# Patient Record
Sex: MALE | Race: BLACK OR AFRICAN AMERICAN | Employment: FULL TIME | URBAN - METROPOLITAN AREA
[De-identification: names, ages, dates, MRNs, and addresses within clinical notes are randomized per-mention and may not be internally consistent; named-entity substitution may affect disease eponyms.]

---

## 2018-08-31 ENCOUNTER — OFFICE VISIT (OUTPATIENT)
Dept: FAMILY MEDICINE CLINIC | Facility: CLINIC | Age: 48
End: 2018-08-31
Payer: COMMERCIAL

## 2018-08-31 ENCOUNTER — TELEPHONE (OUTPATIENT)
Dept: FAMILY MEDICINE CLINIC | Facility: CLINIC | Age: 48
End: 2018-08-31

## 2018-08-31 VITALS
HEIGHT: 69 IN | TEMPERATURE: 97.3 F | HEART RATE: 86 BPM | WEIGHT: 128 LBS | SYSTOLIC BLOOD PRESSURE: 118 MMHG | RESPIRATION RATE: 18 BRPM | BODY MASS INDEX: 18.96 KG/M2 | DIASTOLIC BLOOD PRESSURE: 80 MMHG

## 2018-08-31 DIAGNOSIS — M25.562 ACUTE PAIN OF BOTH KNEES: ICD-10-CM

## 2018-08-31 DIAGNOSIS — M25.561 ACUTE PAIN OF BOTH KNEES: ICD-10-CM

## 2018-08-31 DIAGNOSIS — Z13.6 SCREENING FOR CARDIOVASCULAR CONDITION: ICD-10-CM
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
## 2018-08-31 DIAGNOSIS — Z12.5 SCREENING FOR PROSTATE CANCER: ICD-10-CM

## 2018-08-31 PROCEDURE — 99213 OFFICE O/P EST LOW 20 MIN: CPT | Performed by: FAMILY MEDICINE

## 2018-08-31 RX ORDER — ALBUTEROL SULFATE 90 UG/1
1-2 AEROSOL, METERED RESPIRATORY (INHALATION)
COMMUNITY
Start: 2016-05-12

## 2018-08-31 RX ORDER — NAPROXEN 500 MG/1
TABLET ORAL
COMMUNITY
Start: 2018-07-30

## 2018-08-31 RX ORDER — PREDNISONE 20 MG/1
TABLET ORAL
Qty: 32 TABLET | Refills: 0 | Status: SHIPPED | OUTPATIENT
Start: 2018-08-31

## 2018-08-31 RX ORDER — CYCLOBENZAPRINE HCL 10 MG
TABLET ORAL
COMMUNITY
Start: 2018-07-30

## 2018-08-31 NOTE — ASSESSMENT & PLAN NOTE
Seems to be more of contusion to an internal derangement as he is pretty tender  I will treat him with prednisone as well as voltaren and follow    If he does not get better will suggest he see ortho but I would give this course a good three weeks
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
## 2018-08-31 NOTE — TELEPHONE ENCOUNTER
PA done for pt under [employer] [id number] Diclofenac Sodium 1%   Case ID [id number]  Approved until 08/31/2019  Pt and Pharmacy are aware  [email or web address]

## 2018-09-18 DIAGNOSIS — M25.561 ACUTE PAIN OF BOTH KNEES: ICD-10-CM

## 2018-09-18 DIAGNOSIS — M25.562 ACUTE PAIN OF BOTH KNEES: ICD-10-CM
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
## 2018-09-18 RX ORDER — PREDNISONE 20 MG/1
TABLET ORAL
Qty: 32 TABLET | Refills: 0 | OUTPATIENT
Start: 2018-09-18

## 2018-09-24 DIAGNOSIS — M25.562 ACUTE PAIN OF BOTH KNEES: ICD-10-CM

## 2018-09-24 DIAGNOSIS — M25.561 ACUTE PAIN OF BOTH KNEES: ICD-10-CM
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
## 2018-09-27 ENCOUNTER — APPOINTMENT (OUTPATIENT)
Dept: RADIOLOGY | Facility: CLINIC | Age: 48
End: 2018-09-27
Payer: COMMERCIAL

## 2018-09-27 VITALS
HEART RATE: 79 BPM | BODY MASS INDEX: 18.18 KG/M2 | DIASTOLIC BLOOD PRESSURE: 82 MMHG | WEIGHT: 127 LBS | SYSTOLIC BLOOD PRESSURE: 118 MMHG | HEIGHT: 70 IN

## 2018-09-27 DIAGNOSIS — M22.41 CHONDROMALACIA OF BOTH PATELLAE: ICD-10-CM

## 2018-09-27 DIAGNOSIS — M25.562 PAIN IN BOTH KNEES, UNSPECIFIED CHRONICITY: ICD-10-CM

## 2018-09-27 DIAGNOSIS — M25.561 PAIN IN BOTH KNEES, UNSPECIFIED CHRONICITY: ICD-10-CM

## 2018-09-27 DIAGNOSIS — M22.42 CHONDROMALACIA OF BOTH PATELLAE: ICD-10-CM

## 2018-09-27 DIAGNOSIS — M25.562 ACUTE PAIN OF LEFT KNEE: Primary | ICD-10-CM
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
## 2018-09-27 PROCEDURE — 73562 X-RAY EXAM OF KNEE 3: CPT

## 2018-09-27 PROCEDURE — 99204 OFFICE O/P NEW MOD 45 MIN: CPT | Performed by: ORTHOPAEDIC SURGERY
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
## 2018-11-04 DIAGNOSIS — M25.562 ACUTE PAIN OF BOTH KNEES: ICD-10-CM

## 2018-11-04 DIAGNOSIS — M25.561 ACUTE PAIN OF BOTH KNEES: ICD-10-CM
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
## 2018-11-29 VITALS
HEART RATE: 80 BPM | DIASTOLIC BLOOD PRESSURE: 69 MMHG | WEIGHT: 131.2 LBS | HEIGHT: 70 IN | SYSTOLIC BLOOD PRESSURE: 113 MMHG | BODY MASS INDEX: 18.78 KG/M2

## 2018-11-29 DIAGNOSIS — M22.42 CHONDROMALACIA OF BOTH PATELLAE: Primary | ICD-10-CM

## 2018-11-29 DIAGNOSIS — S83.412D SPRAIN OF MEDIAL COLLATERAL LIGAMENT OF LEFT KNEE, SUBSEQUENT ENCOUNTER: ICD-10-CM

## 2018-11-29 DIAGNOSIS — M25.562 ACUTE PAIN OF BOTH KNEES: ICD-10-CM

## 2018-11-29 DIAGNOSIS — M22.41 CHONDROMALACIA OF BOTH PATELLAE: Primary | ICD-10-CM

## 2018-11-29 DIAGNOSIS — M25.561 ACUTE PAIN OF BOTH KNEES: ICD-10-CM

## 2018-11-29 PROCEDURE — 20610 DRAIN/INJ JOINT/BURSA W/O US: CPT | Performed by: PHYSICIAN ASSISTANT

## 2018-11-29 PROCEDURE — 99213 OFFICE O/P EST LOW 20 MIN: CPT | Performed by: PHYSICIAN ASSISTANT

## 2018-11-29 RX ORDER — BUPIVACAINE HYDROCHLORIDE 5 MG/ML
6 INJECTION, SOLUTION EPIDURAL; INTRACAUDAL
Status: COMPLETED | OUTPATIENT
Start: 2018-11-29 | End: 2018-11-29

## 2018-11-29 RX ORDER — TRIAMCINOLONE ACETONIDE 40 MG/ML
40 INJECTION, SUSPENSION INTRA-ARTICULAR; INTRAMUSCULAR
Status: COMPLETED | OUTPATIENT
Start: 2018-11-29 | End: 2018-11-29

## 2018-11-29 RX ADMIN — TRIAMCINOLONE ACETONIDE 40 MG: 40 INJECTION, SUSPENSION INTRA-ARTICULAR; INTRAMUSCULAR at 11:13

## 2018-11-29 RX ADMIN — BUPIVACAINE HYDROCHLORIDE 6 ML: 5 INJECTION, SOLUTION EPIDURAL; INTRACAUDAL at 11:13

## 2018-11-29 RX ADMIN — BUPIVACAINE HYDROCHLORIDE 6 ML: 5 INJECTION, SOLUTION EPIDURAL; INTRACAUDAL at 11:14

## 2018-11-29 RX ADMIN — TRIAMCINOLONE ACETONIDE 40 MG: 40 INJECTION, SUSPENSION INTRA-ARTICULAR; INTRAMUSCULAR at 11:14
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
## 2018-12-03 ENCOUNTER — EVALUATION (OUTPATIENT)
Dept: PHYSICAL THERAPY | Facility: CLINIC | Age: 48
End: 2018-12-03
Payer: COMMERCIAL

## 2018-12-03 DIAGNOSIS — S83.412D SPRAIN OF MEDIAL COLLATERAL LIGAMENT OF LEFT KNEE, SUBSEQUENT ENCOUNTER: ICD-10-CM

## 2018-12-03 DIAGNOSIS — M25.562 ACUTE PAIN OF BOTH KNEES: ICD-10-CM

## 2018-12-03 DIAGNOSIS — M22.42 CHONDROMALACIA OF BOTH PATELLAE: Primary | ICD-10-CM

## 2018-12-03 DIAGNOSIS — M25.561 ACUTE PAIN OF BOTH KNEES: ICD-10-CM

## 2018-12-03 DIAGNOSIS — M22.41 CHONDROMALACIA OF BOTH PATELLAE: Primary | ICD-10-CM

## 2018-12-03 PROCEDURE — G8979 MOBILITY GOAL STATUS: HCPCS

## 2018-12-03 PROCEDURE — G8978 MOBILITY CURRENT STATUS: HCPCS

## 2018-12-03 PROCEDURE — 97161 PT EVAL LOW COMPLEX 20 MIN: CPT
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
## 2018-12-06 ENCOUNTER — OFFICE VISIT (OUTPATIENT)
Dept: PHYSICAL THERAPY | Facility: CLINIC | Age: 48
End: 2018-12-06
Payer: COMMERCIAL

## 2018-12-06 DIAGNOSIS — M22.42 CHONDROMALACIA OF BOTH PATELLAE: Primary | ICD-10-CM

## 2018-12-06 DIAGNOSIS — M25.562 ACUTE PAIN OF BOTH KNEES: ICD-10-CM

## 2018-12-06 DIAGNOSIS — M22.41 CHONDROMALACIA OF BOTH PATELLAE: Primary | ICD-10-CM

## 2018-12-06 DIAGNOSIS — S83.412D SPRAIN OF MEDIAL COLLATERAL LIGAMENT OF LEFT KNEE, SUBSEQUENT ENCOUNTER: ICD-10-CM

## 2018-12-06 DIAGNOSIS — M25.561 ACUTE PAIN OF BOTH KNEES: ICD-10-CM

## 2018-12-06 PROCEDURE — 97110 THERAPEUTIC EXERCISES: CPT

## 2018-12-06 PROCEDURE — 97140 MANUAL THERAPY 1/> REGIONS: CPT

## 2018-12-06 NOTE — PROGRESS NOTES
Daily Note     Today's date: 2018  Patient name: Rafa Carbone  : 1970  MRN: [id number]  Referring provider: Colton Teixeira  Dx:   Encounter Diagnoses   Name Primary?  Chondromalacia of both patellae Yes    Acute pain of both knees     Sprain of medial collateral ligament of left knee, subsequent encounter                   Subjective: Pt reports 4/10 right knee pain today  States that his left knee is feeling better today  Objective: See treatment diary below    Precautions standard    Specialty Daily Treatment Diary     Manual         PROM knee Bilateral flex and ext       STM Medial bilateral knee       PFJ mobs Inf and med gr II-III       Hamstring stretch 30 sec, 1x3 ea                   Exercise Diary         Rec bike NV       gs stretch with strap        Standing hip abduction        Step ups        Quad sets Hold 5, 2x10 ea       SAQ Hold 5, 3x5 ea       Heel slides Hold 5, 1x15       bridges Off bolster hold 5, 3x5                                                                   Modalities        CP                            Assessment: Pt tolerated treatment well with minimal complaints of pain  Pt demonstrates poor quad set on the right and considerable weakness in quads with SAQ  Reviewed initial HEP with patient and demonstrates independence  Instruct to ice if pain and soreness presents  Plan: Continue with plan of care to decrease pain, improve mobility, strength, and function  Progress treatment as tolerated

## 2018-12-10 ENCOUNTER — APPOINTMENT (OUTPATIENT)
Dept: PHYSICAL THERAPY | Facility: CLINIC | Age: 48
End: 2018-12-10
Payer: COMMERCIAL

## 2018-12-10 NOTE — PROGRESS NOTES
Daily Note     Today's date: 12/10/2018  Patient name: Ayla Diez  : 1970  MRN: [id number]  Referring provider: Lobito Duarte  Dx:   Encounter Diagnosis     ICD-10-CM    1  Chondromalacia of both patellae M22 41     M22 42    2  Acute pain of both knees M25 561     M25 562    3  Sprain of medial collateral ligament of left knee, subsequent encounter Z97 [id number]                   Subjective: ***      Objective: See treatment diary below      Assessment: Tolerated treatment {Tolerated treatment :5718610082}   Patient {assessment:4498139236}      Plan: {PLAN:9429701404}

## 2018-12-13 ENCOUNTER — OFFICE VISIT (OUTPATIENT)
Dept: PHYSICAL THERAPY | Facility: CLINIC | Age: 48
End: 2018-12-13
Payer: COMMERCIAL

## 2018-12-13 DIAGNOSIS — M22.41 CHONDROMALACIA OF BOTH PATELLAE: Primary | ICD-10-CM

## 2018-12-13 DIAGNOSIS — M22.42 CHONDROMALACIA OF BOTH PATELLAE: Primary | ICD-10-CM

## 2018-12-13 DIAGNOSIS — M25.562 ACUTE PAIN OF BOTH KNEES: ICD-10-CM

## 2018-12-13 DIAGNOSIS — S83.412D SPRAIN OF MEDIAL COLLATERAL LIGAMENT OF LEFT KNEE, SUBSEQUENT ENCOUNTER: ICD-10-CM

## 2018-12-13 DIAGNOSIS — M25.561 ACUTE PAIN OF BOTH KNEES: ICD-10-CM

## 2018-12-13 PROCEDURE — 97110 THERAPEUTIC EXERCISES: CPT

## 2018-12-13 PROCEDURE — 97140 MANUAL THERAPY 1/> REGIONS: CPT

## 2018-12-17 ENCOUNTER — OFFICE VISIT (OUTPATIENT)
Dept: PHYSICAL THERAPY | Facility: CLINIC | Age: 48
End: 2018-12-17
Payer: COMMERCIAL

## 2018-12-17 DIAGNOSIS — M22.41 CHONDROMALACIA OF BOTH PATELLAE: Primary | ICD-10-CM

## 2018-12-17 DIAGNOSIS — M22.42 CHONDROMALACIA OF BOTH PATELLAE: Primary | ICD-10-CM

## 2018-12-17 DIAGNOSIS — M25.562 ACUTE PAIN OF BOTH KNEES: ICD-10-CM

## 2018-12-17 DIAGNOSIS — M25.561 ACUTE PAIN OF BOTH KNEES: ICD-10-CM

## 2018-12-17 DIAGNOSIS — S83.412D SPRAIN OF MEDIAL COLLATERAL LIGAMENT OF LEFT KNEE, SUBSEQUENT ENCOUNTER: ICD-10-CM

## 2018-12-17 PROCEDURE — 97140 MANUAL THERAPY 1/> REGIONS: CPT

## 2018-12-17 PROCEDURE — 97110 THERAPEUTIC EXERCISES: CPT

## 2018-12-17 NOTE — PROGRESS NOTES
Daily Note     Today's date: 2018  Patient name: Tavon Doe  : 1970  MRN: [id number]  Referring provider: Laury Patel  Dx:   Encounter Diagnoses   Name Primary?  Chondromalacia of both patellae Yes    Acute pain of both knees     Sprain of medial collateral ligament of left knee, subsequent encounter                   Subjective: Pt reports 2/10 pain in right knee this morning  Denies any pain in left knee  Some soreness present after last session  Objective: See treatment diary below    Precautions standard    Specialty Daily Treatment Diary     Manual       PROM knee Bilateral flex and ext Bilateral flex and ext Bilateral flex and ext     STM Medial bilateral knee Medial Bilateral knee Medial right knee     PFJ mobs Inf and med gr II-III Inf and med gr II-III on right Inf and med gr II-III on right     Hamstring stretch 30 sec, 1x3 ea 30 sec, 1x3 on right 30 sec, 1x3 bilateral                 Exercise Diary         Rec bike NV 5 min, lvl 1 6 min, lvl 1     gs stretch with strap        Standing hip abduction   NV     Step ups   NV     Quad sets Hold 5, 2x10 ea Hold 5, 2x10 ea Hold 5, 2x10 ea     SAQ Hold 5, 3x5 ea Hold 5, 3x5 ea Hold 5, 3x5 ea     Heel slides Hold 5, 1x15 Hold 5, 1x15 Hold 5, 1x15     bridges Off bolster hold 5, 3x5 Off bolster hold 5, 3x5 hold 5, 3x5     Ball Squeezes  1x10 Hold 5, 2x10      clamshells   Red, Hold 5, 2x10                                                  Modalities        CP                            Assessment: Pt tolerated treatment well with no complaints of pain  Pt with minimal to no TTP at medial knee today  Demonstrating near full PROM in bilateral knees  Pt was given updated HEP today and demonstrates understanding  [employer] Code: [email or web address]        Plan: Continue with plan of care to decrease pain, improve mobility, strength, and function

## 2018-12-20 ENCOUNTER — OFFICE VISIT (OUTPATIENT)
Dept: PHYSICAL THERAPY | Facility: CLINIC | Age: 48
End: 2018-12-20
Payer: COMMERCIAL

## 2018-12-20 DIAGNOSIS — M22.41 CHONDROMALACIA OF BOTH PATELLAE: Primary | ICD-10-CM

## 2018-12-20 DIAGNOSIS — M25.561 ACUTE PAIN OF BOTH KNEES: ICD-10-CM

## 2018-12-20 DIAGNOSIS — M25.562 ACUTE PAIN OF BOTH KNEES: ICD-10-CM

## 2018-12-20 DIAGNOSIS — M22.42 CHONDROMALACIA OF BOTH PATELLAE: Primary | ICD-10-CM

## 2018-12-20 DIAGNOSIS — S83.412D SPRAIN OF MEDIAL COLLATERAL LIGAMENT OF LEFT KNEE, SUBSEQUENT ENCOUNTER: ICD-10-CM

## 2018-12-20 PROCEDURE — 97140 MANUAL THERAPY 1/> REGIONS: CPT

## 2018-12-20 PROCEDURE — 97110 THERAPEUTIC EXERCISES: CPT

## 2018-12-20 NOTE — PROGRESS NOTES
Daily Note     Today's date: 2018  Patient name: Molly Mercado  : 1970  MRN: [id number]  Referring provider: Angelina Rosado  Dx:   Encounter Diagnosis     ICD-10-CM    1  Chondromalacia of both patellae M22 41     M22 42    2  Acute pain of both knees M25 561     M25 562    3  Sprain of medial collateral ligament of left knee, subsequent encounter S83 412D                   Subjective:  Pt feels 0/10 pain walking in today  States that he doesn't seem to be bothered when he goes easy on the steps with both feet on each step but really feels the pain when he attempts to climb stairs with an alternating pattern  Objective: See treatment diary below    Precautions standard    Specialty Daily Treatment Diary     Manual      PROM knee Bilateral flex and ext Bilateral flex and ext Bilateral flex and ext Bilateral flex and ext    STM Medial bilateral knee Medial Bilateral knee Medial right knee Medial right knee    PFJ mobs Inf and med gr II-III Inf and med gr II-III on right Inf and med gr II-III on right Inf and med gr II-III on right    Hamstring stretch 30 sec, 1x3 ea 30 sec, 1x3 on right 30 sec, 1x3 bilateral 30 sec, 1x3 bilateral                Exercise Diary         Rec bike NV 5 min, lvl 1 6 min, lvl 1 6 min, lvl 1    gs stretch with strap        Standing hip abduction   NV Hold 5, 2x10 ea    Step ups   NV 4 in, 1x15    Quad sets Hold 5, 2x10 ea Hold 5, 2x10 ea Hold 5, 2x10 ea     SAQ Hold 5, 3x5 ea Hold 5, 3x5 ea Hold 5, 3x5 ea     Heel slides Hold 5, 1x15 Hold 5, 1x15 Hold 5, 1x15 Hold 5, 1x15    bridges Off bolster hold 5, 3x5 Off bolster hold 5, 3x5 hold 5, 3x5 hold 5, 3x5    Ball Squeezes  1x10 Hold 5, 2x10  Hold 5, 2x10    clamshells   Red, Hold 5, 2x10  Red, Hold 5, 1x15    TKE    2 0, 1x15 ea                                        Modalities        CP                            Assessment: Pt tolerated treatment well with no complaints of pain   Pt presents with improved knee flexion with the heel slides  Pt was not able to complete steps with the 6 inch step due to pain, however, the 4 inch was tolerated better with no pain  Plan: Continue with plan of care to decrease pain, improve mobility, strength, and function  Patient co-treated by KEYUR Lopez under my supervision

## 2018-12-24 ENCOUNTER — APPOINTMENT (OUTPATIENT)
Dept: PHYSICAL THERAPY | Facility: CLINIC | Age: 48
End: 2018-12-24
Payer: COMMERCIAL

## 2018-12-24 NOTE — PROGRESS NOTES
Daily Note     Today's date: 2018  Patient name: Elysia Parsons  : 1970  MRN: [id number]  Referring provider: Meka Resendiz  Dx:   Encounter Diagnoses   Name Primary?  Chondromalacia of both patellae Yes    Acute pain of both knees     Sprain of medial collateral ligament of left knee, subsequent encounter                   Subjective: Pt reports       Objective: See treatment diary below    Precautions standard    Specialty Daily Treatment Diary     Manual     PROM knee Bilateral flex and ext Bilateral flex and ext Bilateral flex and ext Bilateral flex and ext Bilateral flex and ext   STM Medial bilateral knee Medial Bilateral knee Medial right knee Medial right knee Medial right knee   PFJ mobs Inf and med gr II-III Inf and med gr II-III on right Inf and med gr II-III on right Inf and med gr II-III on right Inf and med gr II-III on right   Hamstring stretch 30 sec, 1x3 ea 30 sec, 1x3 on right 30 sec, 1x3 bilateral 30 sec, 1x3 bilateral 30 sec, 1x3 bilateral               Exercise Diary         Rec bike NV 5 min, lvl 1 6 min, lvl 1 6 min, lvl 1 6 min, lvl 1   gs stretch with strap        Standing hip abduction   NV Hold 5, 2x10 ea Hold 5, 2x10 ea   Step ups   NV 4 in, 1x15 4 in, 1x15   Quad sets Hold 5, 2x10 ea Hold 5, 2x10 ea Hold 5, 2x10 ea     SAQ Hold 5, 3x5 ea Hold 5, 3x5 ea Hold 5, 3x5 ea     Heel slides Hold 5, 1x15 Hold 5, 1x15 Hold 5, 1x15 Hold 5, 1x15 Hold 5, 1x15   bridges Off bolster hold 5, 3x5 Off bolster hold 5, 3x5 hold 5, 3x5 hold 5, 3x5 hold 5, 3x5   Ball Squeezes  1x10 Hold 5, 2x10  Hold 5, 2x10 Hold 5, 2x10   clamshells   Red, Hold 5, 2x10  Red, Hold 5, 1x15 Red, Hold 5, 1x15   TKE    2 0, 1x15 ea 2 0, 1x15 ea                                       Modalities        CP                            Assessment: {ASSESSMENT:90421}        Plan: Continue with plan of care to decrease pain, improve mobility, strength, and function

## 2018-12-27 ENCOUNTER — OFFICE VISIT (OUTPATIENT)
Dept: PHYSICAL THERAPY | Facility: CLINIC | Age: 48
End: 2018-12-27
Payer: COMMERCIAL

## 2018-12-27 DIAGNOSIS — M22.42 CHONDROMALACIA OF BOTH PATELLAE: Primary | ICD-10-CM

## 2018-12-27 DIAGNOSIS — M22.41 CHONDROMALACIA OF BOTH PATELLAE: Primary | ICD-10-CM

## 2018-12-27 DIAGNOSIS — M25.561 ACUTE PAIN OF BOTH KNEES: ICD-10-CM

## 2018-12-27 DIAGNOSIS — S83.412D SPRAIN OF MEDIAL COLLATERAL LIGAMENT OF LEFT KNEE, SUBSEQUENT ENCOUNTER: ICD-10-CM

## 2018-12-27 DIAGNOSIS — M25.562 ACUTE PAIN OF BOTH KNEES: ICD-10-CM

## 2018-12-27 PROCEDURE — 97140 MANUAL THERAPY 1/> REGIONS: CPT

## 2018-12-27 PROCEDURE — 97110 THERAPEUTIC EXERCISES: CPT

## 2018-12-27 NOTE — PROGRESS NOTES
Daily Note     Today's date: 2018  Patient name: Herson Sahni  : 1970  MRN: [id number]  Referring provider: Yesi Parra  Dx:   Encounter Diagnosis     ICD-10-CM    1  Chondromalacia of both patellae M22 41     M22 42    2  Acute pain of both knees M25 561     M25 562    3  Sprain of medial collateral ligament of left knee, subsequent encounter S83 412D        Start Time: 1015  Stop Time: 1115  Total time in clinic (min): 60 minutes    Subjective: Pt feels 2/10 pain walking in today  States that he started his swimming classes yesterday so he sore not only in the knee but throughout his body  His pain has improved with going up and down stairs  Yesterday when he was taking his fins off after swimming class, he did a fast jerky motion which caused sharp pain in his R knee  Pt states that he tries to do his HEP regularly         Objective: See treatment diary below    Precautions standard    Specialty Daily Treatment Diary     Manual     PROM knee Bilateral flex and ext Bilateral flex and ext Bilateral flex and ext Bilateral flex and ext Bilateral flex and ext   STM Medial bilateral knee Medial Bilateral knee Medial right knee Medial right knee Medial right knee   PFJ mobs Inf and med gr II-III Inf and med gr II-III on right Inf and med gr II-III on right Inf and med gr II-III on right Inf and med gr II-III on right   Hamstring stretch 30 sec, 1x3 ea 30 sec, 1x3 on right 30 sec, 1x3 bilateral 30 sec, 1x3 bilateral 30 sec, 1x3 bilateral               Exercise Diary         Rec bike NV 5 min, lvl 1 6 min, lvl 1 6 min, lvl 1 7 min, lvl 1   gs stretch with strap        Standing hip abduction   NV Hold 5, 2x10 ea Hold 5, 2x10 ea   Step ups   NV 4 in, 1x15 4 in, 1x15   Quad sets Hold 5, 2x10 ea Hold 5, 2x10 ea Hold 5, 2x10 ea     SAQ Hold 5, 3x5 ea Hold 5, 3x5 ea Hold 5, 3x5 ea     Heel slides Hold 5, 1x15 Hold 5, 1x15 Hold 5, 1x15 Hold 5, 1x15 Hold 5, 1x15   bridges Off bolster hold 5, 3x5 Off bolster hold 5, 3x5 hold 5, 3x5 hold 5, 3x5 hold 5, 3x5   Ball Squeezes  1x10 Hold 5, 2x10  Hold 5, 2x10 Hold 5, 2x10   clamshells   Red, Hold 5, 2x10  Red, Hold 5, 1x15 Red, Hold 5, 1x15   TKE    2 0, 1x15 ea 2 0, 1x15 ea                                       Modalities        CP                            Assessment: Pt tolerated treatment well with no complaints of pain  Pt required cueing when doing bridges to prevent back extension and rather lift with the butt muscles and squeezing the glutes in which he demonstrates understanding  Pt presents with no tenderness at medial knee  Plan: Continue with plan of care to decrease pain, improve mobility, strength, and function  Patient co-treated by KEYUR Lopez under my supervision

## 2018-12-31 ENCOUNTER — APPOINTMENT (OUTPATIENT)
Dept: PHYSICAL THERAPY | Facility: CLINIC | Age: 48
End: 2018-12-31
Payer: COMMERCIAL

## 2018-12-31 NOTE — PROGRESS NOTES
Daily Note     Today's date: 2018  Patient name: Vladimir Welsh  : 1970  MRN: [id number]  Referring provider: Melani Lopez  Dx:   Encounter Diagnosis     ICD-10-CM    1  Chondromalacia of both patellae M22 41     M22 42    2  Acute pain of both knees M25 561     M25 562    3  Sprain of medial collateral ligament of left knee, subsequent encounter S83 184D                   Subjective: Pt feels ***/10 pain walking in today  States that he ***      Objective: See treatment diary below    Precautions standard    Specialty Daily Treatment Diary     Manual     PROM knee Bilateral flex and ext  Bilateral flex and ext Bilateral flex and ext Bilateral flex and ext   STM Medial right knee  Medial right knee Medial right knee Medial right knee   PFJ mobs Inf and med gr II-III on right  Inf and med gr II-III on right Inf and med gr II-III on right Inf and med gr II-III on right   Hamstring stretch 30 sec, 1x3 bilateral  30 sec, 1x3 bilateral 30 sec, 1x3 bilateral 30 sec, 1x3 bilateral               Exercise Diary         Rec bike 7 min, lvl 1  6 min, lvl 1 6 min, lvl 1 7 min, lvl 1   gs stretch with strap        Standing hip abduction Hold 5, 2x10 ea  NV Hold 5, 2x10 ea Hold 5, 2x10 ea   Step ups 4 in, 1x15  NV 4 in, 1x15 4 in, 1x15   Quad sets   Hold 5, 2x10 ea     SAQ   Hold 5, 3x5 ea     Heel slides Hold 5, 1x15  Hold 5, 1x15 Hold 5, 1x15 Hold 5, 1x15   bridges hold 5, 3x5  hold 5, 3x5 hold 5, 3x5 hold 5, 3x5   Ball Squeezes Hold 5, 2x10  Hold 5, 2x10  Hold 5, 2x10 Hold 5, 2x10   clamshells Red, Hold 5, 1x15  Red, Hold 5, 2x10  Red, Hold 5, 1x15 Red, Hold 5, 1x15   TKE 2 0, 1x15 ea   2 0, 1x15 ea 2 0, 1x15 ea                                       Modalities        CP                            Assessment: Pt tolerated treatment *** with *** complaints of pain  {Tolerated treatment :7501612179}   Patient {assessment:4143422694}      Plan: Continue with plan of care to decrease pain, improve mobility, strength, and function  Patient co-treated by KEYUR Thayer under my supervision

## 2019-01-03 ENCOUNTER — OFFICE VISIT (OUTPATIENT)
Dept: PHYSICAL THERAPY | Facility: CLINIC | Age: 49
End: 2019-01-03
Payer: COMMERCIAL

## 2019-01-03 DIAGNOSIS — M22.42 CHONDROMALACIA OF BOTH PATELLAE: Primary | ICD-10-CM

## 2019-01-03 DIAGNOSIS — S83.412D SPRAIN OF MEDIAL COLLATERAL LIGAMENT OF LEFT KNEE, SUBSEQUENT ENCOUNTER: ICD-10-CM

## 2019-01-03 DIAGNOSIS — M22.41 CHONDROMALACIA OF BOTH PATELLAE: Primary | ICD-10-CM

## 2019-01-03 DIAGNOSIS — M25.561 ACUTE PAIN OF BOTH KNEES: ICD-10-CM

## 2019-01-03 DIAGNOSIS — M25.562 ACUTE PAIN OF BOTH KNEES: ICD-10-CM

## 2019-01-03 PROCEDURE — 97140 MANUAL THERAPY 1/> REGIONS: CPT

## 2019-01-03 PROCEDURE — 97112 NEUROMUSCULAR REEDUCATION: CPT

## 2019-01-03 PROCEDURE — 97110 THERAPEUTIC EXERCISES: CPT

## 2019-01-03 NOTE — PROGRESS NOTES
Daily Note     Today's date: 1/3/2019  Patient name: Hernan Buck  : 1970  MRN: [id number]  Referring provider: Karuna Sandoval  Dx:   Encounter Diagnosis     ICD-10-CM    1  Chondromalacia of both patellae M22 41     M22 42    2  Acute pain of both knees M25 561     M25 562    3  Sprain of medial collateral ligament of left knee, subsequent encounter S83 382D                   Subjective: Pt feels 0/10 pain walking in today, however, states that he can really feel the weather changing  States that he gets a sharp pain about once a day which is usually while stair climbing or right after  Pt states that he tries to do HEP when he can         Objective: See treatment diary below    Precautions standard    Specialty Daily Treatment Diary     Manual  1/3  12/17 12/20 12/27   PROM knee Bilateral flex and ext  Bilateral flex and ext Bilateral flex and ext Bilateral flex and ext   STM   Medial right knee Medial right knee Medial right knee   PFJ mobs Inf and med gr II-III on right  Inf and med gr II-III on right Inf and med gr II-III on right Inf and med gr II-III on right   Hamstring stretch 30 sec, 1x3 bilateral  30 sec, 1x3 bilateral 30 sec, 1x3 bilateral 30 sec, 1x3 bilateral               Exercise Diary         Rec bike 7 min, lvl 1  6 min, lvl 1 6 min, lvl 1 7 min, lvl 1   gs stretch with strap        Standing hip abduction Hold 5, 2x10 ea  NV Hold 5, 2x10 ea Hold 5, 2x10 ea   Step ups 6 in, 2x10  NV 4 in, 1x15 4 in, 1x15   Quad sets   Hold 5, 2x10 ea     SAQ   Hold 5, 3x5 ea     Heel slides   Hold 5, 1x15 Hold 5, 1x15 Hold 5, 1x15   bridges hold 5, 3x5  hold 5, 3x5 hold 5, 3x5 hold 5, 3x5   Ball Squeezes Hold 5, 2x10  Hold 5, 2x10  Hold 5, 2x10 Hold 5, 2x10   clamshells   Red, Hold 5, 2x10  Red, Hold 5, 1x15 Red, Hold 5, 1x15   TKE 3 0, 1x15 ea   2 0, 1x15 ea 2 0, 1x15 ea   Wall Squats 1x15 with ball                                    Modalities        CP                            Assessment: Pt tolerated treatment well with minimal to no complaints of pain  Pt presents with no tenderness in R knee  Pt required cueing on proper squatting mechanics and demonstrates understanding  Pt felt pain in R knee when performing squats, however, patient was cued to decrease depth of squat to help relieve symptoms  Plan: Continue with plan of care to decrease pain, improve mobility, strength, and function  Patient co-treated by KEYUR Burton under my supervision

## 2019-01-07 ENCOUNTER — APPOINTMENT (OUTPATIENT)
Dept: PHYSICAL THERAPY | Facility: CLINIC | Age: 49
End: 2019-01-07
Payer: COMMERCIAL
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
## 2019-01-08 ENCOUNTER — OFFICE VISIT (OUTPATIENT)
Dept: PHYSICAL THERAPY | Facility: CLINIC | Age: 49
End: 2019-01-08
Payer: COMMERCIAL

## 2019-01-08 DIAGNOSIS — S83.412D SPRAIN OF MEDIAL COLLATERAL LIGAMENT OF LEFT KNEE, SUBSEQUENT ENCOUNTER: ICD-10-CM

## 2019-01-08 DIAGNOSIS — M22.41 CHONDROMALACIA OF BOTH PATELLAE: Primary | ICD-10-CM

## 2019-01-08 DIAGNOSIS — M25.562 ACUTE PAIN OF BOTH KNEES: ICD-10-CM

## 2019-01-08 DIAGNOSIS — M25.561 ACUTE PAIN OF BOTH KNEES: ICD-10-CM

## 2019-01-08 DIAGNOSIS — M22.42 CHONDROMALACIA OF BOTH PATELLAE: Primary | ICD-10-CM

## 2019-01-08 PROCEDURE — G8979 MOBILITY GOAL STATUS: HCPCS

## 2019-01-08 PROCEDURE — 97112 NEUROMUSCULAR REEDUCATION: CPT

## 2019-01-08 PROCEDURE — G8978 MOBILITY CURRENT STATUS: HCPCS

## 2019-01-08 PROCEDURE — 97110 THERAPEUTIC EXERCISES: CPT

## 2019-01-08 NOTE — PROGRESS NOTES
PT Re-Evaluation     Today's date: 2019  Patient name: Bre Araujo  : 1970  MRN: [id number]  Referring provider: Jean Claude Dill  Dx:   Encounter Diagnosis     ICD-10-CM    1  Chondromalacia of both patellae M22 41     M22 42    2  Acute pain of both knees M25 561     M25 562    3  Sprain of medial collateral ligament of left knee, subsequent encounter S83 412D                   Assessment  Assessment details: Bre Araujo is a 50 y o  male who has been seen in physical therapy for 8 visits secondary to the diagnosis of Chondromalacia of both patellae  (primary encounter diagnosis), Acute pain of both knees, and  Sprain of medial collateral ligament of left knee, subsequent encounter and is making steady gains towards established goals  The patient has demonstrated decreased pain level, improved lower extremity strength, reduced gait deviations, improved lower extremity ROM, improved flexibility and improved balance  He would benefit from continued PT services to address remaining impairments outlined in Progress Note and to maximize function      Impairments: abnormal gait, abnormal or restricted ROM, abnormal movement, activity intolerance, impaired balance, impaired physical strength, lacks appropriate home exercise program, pain with function, weight-bearing intolerance and poor body mechanics  Understanding of Dx/Px/POC: good   Prognosis: good    Goals  Short Term Goals (4 weeks)  Pt will be independent in basic Home Exercise Program -MET  Pt will be able to ambulate 15 minutes with pain no more than 3/10-MET  Pt will be able to ascend 1 flight of stairs reciprocally with use of handrails  -Partially MET  Pt will demonstrate good hip hinge and squatting mechanics-MET      Long Term Goals (6-8 weeks)  Pt will be independent in comprehensive Home Exercise Program -NOT MET  Pt will be able to ambulate 45 minutes with pain no more than 2/10-NOT MET  Pt will be able to perform ADLs with pain no more than 3/10-NOT MET  Pt will demonstrate improved SLS balance to at least 30 sec-NOT MET  Pt will demonstrate improved knee and hip MMT strength to at least 4/5-NOT MET    Plan  Patient would benefit from: skilled PT  Referral necessary: No  Planned modality interventions: cryotherapy and thermotherapy: hydrocollator packs  Planned therapy interventions: ADL training, activity modification, balance, body mechanics training, flexibility, functional ROM exercises, gait training, graded exercise, home exercise program, transfer training, therapeutic exercise, therapeutic activities, stretching, strengthening, postural training, patient education, neuromuscular re-education, manual therapy and joint mobilization  Frequency: 2x week  Duration in weeks: 8  Treatment plan discussed with: patient        Subjective Evaluation    History of Present Illness  Mechanism of injury: Pt reports he feels like he's at 90%  States that he feels like PT has been helping and has noticed an increase in function  Pt reports that his pain is less intense but continues to get sharp pain in right knee  Symptoms in left knee have resolved  Mild sleep disturbances present but this has improved  Pt reports continued difficulties walking greater than 30 min, negotiating stairs (non reciprocal mostly), squatting, and bending over   Pt would like to continue with PT, he will be away for the next 2 weeks   Pain  Current pain ratin  At best pain ratin  At worst pain rating: 3  Location: right knee  Quality: tight and dull ache  Relieving factors: medications and rest  Aggravating factors: lifting, sitting, stair climbing, standing and walking  Progression: improved    Social Support  Steps to enter house: yes  Stairs in house: yes   Lives in: multiple-level home    Employment status: working (, sitting most of the day)  Exercise history: marathon running       Diagnostic Tests  MRI studies: abnormal  Treatments  Previous treatment: injection treatment  Patient Goals  Patient goals for therapy: decreased pain, increased motion, increased strength, independence with ADLs/IADLs and return to sport/leisure activities (ongoing)  Patient goal: return to running        Objective     Observations   Left Knee   Positive for atrophy  Negative for edema  Right Knee   Positive for atrophy  Negative for edema  Palpation   Left   No palpable tenderness to the distal semimembranosus and distal semitendinosus  Right   No palpable tenderness to the distal semimembranosus and distal semitendinosus  Tenderness   Left Knee   No tenderness in the MCL (distal) and MCL (proximal)  Right Knee   No tenderness in the MCL (distal), MCL (proximal) and superior patella  Neurological Testing     Sensation     Knee   Left Knee   Intact: light touch    Right Knee   Intact: light touch     Active Range of Motion   Left Knee   Flexion: 130 degrees with pain  Extension: 0 degrees     Right Knee   Flexion: 120 degrees with pain  Extension: 0 degrees     Mobility   Patellar Mobility:   Left Knee   WFL: medial and inferior  Right Knee   WFL: medial and inferior    Strength/Myotome Testing     Left Hip   Planes of Motion   Flexion: 4  Abduction: 4  Adduction: 4+    Right Hip   Planes of Motion   Flexion: 4  Abduction: 4  Adduction: 4+    Left Knee   Flexion: 4-  Extension: 4+    Right Knee   Flexion: 4  Extension: 4+    Additional Strength Details  Pain with resisted right knee flexion    Tests     Left Knee   Positive valgus stress test at 30 degrees  Negative anterior drawer and patella-femoral grind  Right Knee   Positive valgus stress test at 30 degrees  Negative anterior drawer and patella-femoral grind       Ambulation     Ambulation: Stairs   Ascend stairs: independent  Pattern: non-reciprocal  Railings: without rails  Descend stairs: independent  Pattern: non-reciprocal  Railings: without rails  Curbs: independent    Additional Stairs Ambulation Details  Occasionally reciprocal    Observational Gait   Left stance time and right step length within functional limits  Decreased walking speed, stride length, right stance time and left step length  Left foot contact pattern: heel to toe  Right foot contact pattern: heel to toe  Base of support: normal    Quality of Movement During Gait   Trunk    Trunk (Right): Negative lateral lean over stance limb  Knee    Knee (Left): Positive valgus  Knee (Right): Positive valgus  Functional Assessment   Squat   Left within functional limits, right within functional limits, pain, left valgus and right valgus  Forward Step Up 8"   Left Leg  Within functional limits and valgus  No pain  Right Leg  Within functional limits, pain and valgus  Forward Step Down 8"   Left Leg  Within functional limits and pain  Right Leg  Within functional limits  No pain  Single Leg Stance   Left: 30 seconds  Right: 15 seconds    General Comments     Knee Comments  Flexibility: mod hamstring tightness right greater than left        Flowsheet Rows      Most Recent Value   PT/OT G-Codes   Current Score  56   Projected Score  55   FOTO information reviewed  Yes   Assessment Type  Re-evaluation   G code set  Mobility: Walking & Moving Around   Mobility: Walking and Moving Around Current Status ()  CK   Mobility: Walking and Moving Around Goal Status ()  CK          Med[employer] access code: [email or web address]    Precautions standard    Specialty Daily Treatment Diary     Manual  1/3 1/8  12/20 12/27   PROM knee Bilateral flex and ext   Bilateral flex and ext Bilateral flex and ext   STM    Medial right knee Medial right knee   PFJ mobs Inf and med gr II-III on right   Inf and med gr II-III on right Inf and med gr II-III on right   Hamstring stretch 30 sec, 1x3 bilateral   30 sec, 1x3 bilateral 30 sec, 1x3 bilateral               Exercise Diary         Rec bike 7 min, lvl 1 7 min, lvl 1  6 min, lvl 1 7 min, lvl 1   gs stretch with strap        Standing hip abduction Hold 5, 2x10 ea Hold 5, 2x10 ea  Hold 5, 2x10 ea Hold 5, 2x10 ea   Step ups 6 in, 2x10 6 in, 2x10 ea  4 in, 1x15 4 in, 1x15   Quad sets        SAQ  Hold 5, 2x10 ea      Heel slides    Hold 5, 1x15 Hold 5, 1x15   bridges hold 5, 3x5 hold 5, 2x10  hold 5, 3x5 hold 5, 3x5   Ball Squeezes Hold 5, 2x10 Hold 5, 2x10  Hold 5, 2x10 Hold 5, 2x10   clamshells    Red, Hold 5, 1x15 Red, Hold 5, 1x15   TKE 3 0, 1x15 ea 3 0, 1x15 ea  2 0, 1x15 ea 2 0, 1x15 ea   Wall Squats 1x15 with ball  1x15 with ball                                  Modalities        CP

## 2019-01-10 ENCOUNTER — OFFICE VISIT (OUTPATIENT)
Dept: PHYSICAL THERAPY | Facility: CLINIC | Age: 49
End: 2019-01-10
Payer: COMMERCIAL

## 2019-01-10 DIAGNOSIS — S83.412D SPRAIN OF MEDIAL COLLATERAL LIGAMENT OF LEFT KNEE, SUBSEQUENT ENCOUNTER: ICD-10-CM

## 2019-01-10 DIAGNOSIS — M25.561 ACUTE PAIN OF BOTH KNEES: ICD-10-CM

## 2019-01-10 DIAGNOSIS — M22.41 CHONDROMALACIA OF BOTH PATELLAE: Primary | ICD-10-CM

## 2019-01-10 DIAGNOSIS — M25.562 ACUTE PAIN OF BOTH KNEES: ICD-10-CM

## 2019-01-10 DIAGNOSIS — M22.42 CHONDROMALACIA OF BOTH PATELLAE: Primary | ICD-10-CM

## 2019-01-10 PROCEDURE — 97112 NEUROMUSCULAR REEDUCATION: CPT

## 2019-01-10 PROCEDURE — 97110 THERAPEUTIC EXERCISES: CPT

## 2019-01-10 PROCEDURE — 97140 MANUAL THERAPY 1/> REGIONS: CPT

## 2019-01-10 NOTE — PROGRESS NOTES
Daily Note     Today's date: 1/10/2019  Patient name: Poppy Gordon  : 1970  MRN: [id number]  Referring provider: Pepito Mercedes  Dx:   Encounter Diagnoses   Name Primary?  Chondromalacia of both patellae Yes    Acute pain of both knees     Sprain of medial collateral ligament of left knee, subsequent encounter                   Subjective: Pt reports 0/10 pain currently  States that he had 8/10 pain in right knee yesterday while walking from his car to work, he thinks because of the cold weather  Objective: See treatment diary below    Precautions standard    Specialty Daily Treatment Diary     Manual  1/3 1/8 1/10  12/27   PROM knee Bilateral flex and ext  Bilateral flex and ext  Bilateral flex and ext   STM     Medial right knee   PFJ mobs Inf and med gr II-III on right  Inf and med gr II-III on right  Inf and med gr II-III on right   Hamstring stretch 30 sec, 1x3 bilateral  30 sec, 1x3 bilateral  30 sec, 1x3 bilateral               Exercise Diary         Rec bike 7 min, lvl 1 7 min, lvl 1 8 min, lvl 2  7 min, lvl 1   gs stretch with strap        Standing hip abduction Hold 5, 2x10 ea Hold 5, 2x10 ea Hold 5, 2x10 ea  Hold 5, 2x10 ea   Step ups 6 in, 2x10 6 in, 2x10 ea 6 in, 2x10 ea  4 in, 1x15   Quad sets        SAQ  Hold 5, 2x10 ea Hold 5, 2x10 ea     Heel slides     Hold 5, 1x15   bridges hold 5, 3x5 hold 5, 2x10 hold 5, 2x10  hold 5, 3x5   Ball Squeezes Hold 5, 2x10 Hold 5, 2x10   Hold 5, 2x10   clamshells     Red, Hold 5, 1x15   TKE 3 0, 1x15 ea 3 0, 1x15 ea 5 0, 1x15 ea  2 0, 1x15 ea   Wall Squats 1x15 with ball  1x15 with ball 2x10 with ball                                 Modalities        CP                            Assessment: Pt tolerated treatment well with no complaints of pain  Pt was given updated HEP on [employer] today because he will be away for the next 2 weeks  Demonstrates understanding  Pt demonstrating improved form with step ups and squats           Plan: Continue with plan of care to decrease pain, improve mobility, strength, and function

## 2019-01-14 ENCOUNTER — APPOINTMENT (OUTPATIENT)
Dept: PHYSICAL THERAPY | Facility: CLINIC | Age: 49
End: 2019-01-14
Payer: COMMERCIAL

## 2019-01-17 ENCOUNTER — APPOINTMENT (OUTPATIENT)
Dept: PHYSICAL THERAPY | Facility: CLINIC | Age: 49
End: 2019-01-17
Payer: COMMERCIAL

## 2019-01-21 ENCOUNTER — APPOINTMENT (OUTPATIENT)
Dept: PHYSICAL THERAPY | Facility: CLINIC | Age: 49
End: 2019-01-21
Payer: COMMERCIAL

## 2019-01-24 ENCOUNTER — APPOINTMENT (OUTPATIENT)
Dept: PHYSICAL THERAPY | Facility: CLINIC | Age: 49
End: 2019-01-24
Payer: COMMERCIAL

## 2019-01-28 ENCOUNTER — OFFICE VISIT (OUTPATIENT)
Dept: PHYSICAL THERAPY | Facility: CLINIC | Age: 49
End: 2019-01-28
Payer: COMMERCIAL

## 2019-01-28 DIAGNOSIS — S83.412D SPRAIN OF MEDIAL COLLATERAL LIGAMENT OF LEFT KNEE, SUBSEQUENT ENCOUNTER: ICD-10-CM

## 2019-01-28 DIAGNOSIS — M25.561 ACUTE PAIN OF BOTH KNEES: ICD-10-CM

## 2019-01-28 DIAGNOSIS — M22.42 CHONDROMALACIA OF BOTH PATELLAE: Primary | ICD-10-CM

## 2019-01-28 DIAGNOSIS — M25.562 ACUTE PAIN OF BOTH KNEES: ICD-10-CM

## 2019-01-28 DIAGNOSIS — M22.41 CHONDROMALACIA OF BOTH PATELLAE: Primary | ICD-10-CM

## 2019-01-28 PROCEDURE — 97140 MANUAL THERAPY 1/> REGIONS: CPT

## 2019-01-28 PROCEDURE — 97112 NEUROMUSCULAR REEDUCATION: CPT

## 2019-01-28 PROCEDURE — 97110 THERAPEUTIC EXERCISES: CPT

## 2019-01-28 NOTE — PROGRESS NOTES
Daily Note     Today's date: 2019  Patient name: Ashley Lugo  : 1970  MRN: [id number]  Referring provider: Malcolm Pabon  Dx:   Encounter Diagnoses   Name Primary?  Chondromalacia of both patellae Yes    Acute pain of both knees     Sprain of medial collateral ligament of left knee, subsequent encounter                   Subjective: Pt reports he had to do a lot of walking while in [facility] for his business trip  States that his right knee pain would increase after 1-1 5 hours  Pain currently rated 2/10 in right knee  Denies any pain with left knee  Objective: See treatment diary below    Precautions standard    Specialty Daily Treatment Diary     Manual  1/3 1/8 1/10 1/28    PROM knee Bilateral flex and ext  Bilateral flex and ext Bilateral flex and ext    STM        PFJ mobs Inf and med gr II-III on right  Inf and med gr II-III on right Inf and med gr II-III on right    Hamstring stretch 30 sec, 1x3 bilateral  30 sec, 1x3 bilateral 30 sec, 1x3 bilateral                Exercise Diary         Rec bike 7 min, lvl 1 7 min, lvl 1 8 min, lvl 2 6 min, lvl 1    gs stretch with strap        Standing hip abduction Hold 5, 2x10 ea Hold 5, 2x10 ea Hold 5, 2x10 ea Hold 5, 2x10 ea    Step ups 6 in, 2x10 6 in, 2x10 ea 6 in, 2x10 ea 6 in, 2x10 ea    SLR    Left, hold 5, 1x15    SAQ  Hold 5, 2x10 ea Hold 5, 2x10 ea Hold 5, 2x10 ea    Heel slides        bridges hold 5, 3x5 hold 5, 2x10 hold 5, 2x10 hold 5, 2x10    Ball Squeezes Hold 5, 2x10 Hold 5, 2x10      clamshells        TKE 3 0, 1x15 ea 3 0, 1x15 ea 5 0, 1x15 ea 5 0, 1x15 ea    Wall Squats 1x15 with ball  1x15 with ball 2x10 with ball 2x10 with ball                                Modalities        CP                            Assessment: Pt tolerated treatment well with minimal complaints of pain  Pt with increasing pain in right knee with SLR and demonstrates extension lag  Improved mechanics with step ups demonstrated   Instructed to ice knee if soreness presents  Plan: Continue with plan of care to decrease pain, improve mobility, strength, and function

## 2019-02-04 ENCOUNTER — APPOINTMENT (OUTPATIENT)
Dept: PHYSICAL THERAPY | Facility: CLINIC | Age: 49
End: 2019-02-04
Payer: COMMERCIAL

## 2019-02-07 ENCOUNTER — OFFICE VISIT (OUTPATIENT)
Dept: PHYSICAL THERAPY | Facility: CLINIC | Age: 49
End: 2019-02-07
Payer: COMMERCIAL

## 2019-02-07 DIAGNOSIS — S83.412D SPRAIN OF MEDIAL COLLATERAL LIGAMENT OF LEFT KNEE, SUBSEQUENT ENCOUNTER: ICD-10-CM

## 2019-02-07 DIAGNOSIS — M22.41 CHONDROMALACIA OF BOTH PATELLAE: Primary | ICD-10-CM

## 2019-02-07 DIAGNOSIS — M22.42 CHONDROMALACIA OF BOTH PATELLAE: Primary | ICD-10-CM

## 2019-02-07 DIAGNOSIS — M25.562 ACUTE PAIN OF BOTH KNEES: ICD-10-CM

## 2019-02-07 DIAGNOSIS — M25.561 ACUTE PAIN OF BOTH KNEES: ICD-10-CM

## 2019-02-07 PROCEDURE — 97110 THERAPEUTIC EXERCISES: CPT

## 2019-02-07 PROCEDURE — 97140 MANUAL THERAPY 1/> REGIONS: CPT

## 2019-02-07 PROCEDURE — 97112 NEUROMUSCULAR REEDUCATION: CPT

## 2019-02-07 NOTE — PROGRESS NOTES
Daily Note     Today's date: 2019  Patient name: Ad Talley  : 1970  MRN: [id number]  Referring provider: Cuauhtemoc Booker  Dx:   Encounter Diagnoses   Name Primary?  Chondromalacia of both patellae Yes    Acute pain of both knees     Sprain of medial collateral ligament of left knee, subsequent encounter                   Subjective: Pt reports that his knee is getting better  Continued to buckle on his several times a day and has to negotiate stairs non reciprocally at times  States that he started to suffer from numbness and tingling in right foot especially while sleeping  Hard to get comfortable         Objective: See treatment diary below    Precautions standard    Specialty Daily Treatment Diary     Manual  1/3 1/8 1/10 1/28 2/7   PROM knee Bilateral flex and ext  Bilateral flex and ext Bilateral flex and ext Bilateral flex and ext   STM     Medial right knee   PFJ mobs Inf and med gr II-III on right  Inf and med gr II-III on right Inf and med gr II-III on right Inf and med gr II-III on right   Hamstring stretch 30 sec, 1x3 bilateral  30 sec, 1x3 bilateral 30 sec, 1x3 bilateral 30 sec, 1x3 bilateral               Exercise Diary         Rec bike 7 min, lvl 1 7 min, lvl 1 8 min, lvl 2 6 min, lvl 1 7 min, lvl 1   gs stretch with strap        Standing hip abduction Hold 5, 2x10 ea Hold 5, 2x10 ea Hold 5, 2x10 ea Hold 5, 2x10 ea Hold 5, 2x10 ea   Step ups 6 in, 2x10 6 in, 2x10 ea 6 in, 2x10 ea 6 in, 2x10 ea 6 in, 2x10 ea   SLR    Left, hold 5, 1x15 Bilateral, hold 5, 2x10   SAQ  Hold 5, 2x10 ea Hold 5, 2x10 ea Hold 5, 2x10 ea    Heel slides        bridges hold 5, 3x5 hold 5, 2x10 hold 5, 2x10 hold 5, 2x10 hold 5, 2x10   Ball Squeezes Hold 5, 2x10 Hold 5, 2x10      clamshells        TKE 3 0, 1x15 ea 3 0, 1x15 ea 5 0, 1x15 ea 5 0, 1x15 ea 5 0, 2x10 ea   Wall Squats 1x15 with ball  1x15 with ball 2x10 with ball 2x10 with ball    EIS     1x10                       Modalities        CP Assessment: Pt denies any pain with treatment and tolerated well  Pt's lumbar spine screened and presents with increased radicular symptoms with repeated flexion and centralization with extension  Given EIS for HEP and demonstrates understanding  Instructed to deferred if it increases pain/symptoms  Pt progressing well with LE strength  Plan: Continue with plan of care to decrease pain, improve mobility, strength, and function

## 2019-02-13 ENCOUNTER — OFFICE VISIT (OUTPATIENT)
Dept: PHYSICAL THERAPY | Facility: CLINIC | Age: 49
End: 2019-02-13
Payer: COMMERCIAL

## 2019-02-13 DIAGNOSIS — M25.561 ACUTE PAIN OF BOTH KNEES: ICD-10-CM

## 2019-02-13 DIAGNOSIS — S83.412D SPRAIN OF MEDIAL COLLATERAL LIGAMENT OF LEFT KNEE, SUBSEQUENT ENCOUNTER: ICD-10-CM

## 2019-02-13 DIAGNOSIS — M25.562 ACUTE PAIN OF BOTH KNEES: ICD-10-CM

## 2019-02-13 DIAGNOSIS — M22.42 CHONDROMALACIA OF BOTH PATELLAE: Primary | ICD-10-CM

## 2019-02-13 DIAGNOSIS — M22.41 CHONDROMALACIA OF BOTH PATELLAE: Primary | ICD-10-CM

## 2019-02-13 PROCEDURE — G8979 MOBILITY GOAL STATUS: HCPCS

## 2019-02-13 PROCEDURE — G8980 MOBILITY D/C STATUS: HCPCS

## 2019-02-13 PROCEDURE — 97110 THERAPEUTIC EXERCISES: CPT

## 2019-02-13 PROCEDURE — 97112 NEUROMUSCULAR REEDUCATION: CPT

## 2019-02-13 NOTE — PROGRESS NOTES
PT Discharge    Today's date: 2019  Patient name: Tavon Doe  : 1970  MRN: [id number]  Referring provider: Laury Patel  Dx:   Encounter Diagnosis     ICD-10-CM    1  Chondromalacia of both patellae M22 41     M22 42    2  Acute pain of both knees M25 561     M25 562    3  Sprain of medial collateral ligament of left knee, subsequent encounter S83 [id number]                   Assessment  Assessment details: Tavon Doe is a 50 y o  male who has been seen in physical therapy for 12 visits secondary to the diagnosis of Chondromalacia of both patellae  (primary encounter diagnosis), Acute pain of both knees, and   Sprain of medial collateral ligament of left knee, subsequent encounter and has made fair gains towards established goals  The patient has demonstrated decreased pain level, improved lower extremity strength, reduced gait deviations, improved lower extremity ROM and reduced swelling  He has reached max potential in PT at this time and is independent in HEP  Recommended pt follow up with orthopedics secondary to recurrent incidences of knee instability and persistent symptoms, in agreement       Impairments: abnormal or restricted ROM, abnormal movement, activity intolerance, impaired balance, impaired physical strength, lacks appropriate home exercise program, pain with function and weight-bearing intolerance  Understanding of Dx/Px/POC: good   Prognosis: good    Goals  Short Term Goals (4 weeks)  Pt will be independent in basic Home Exercise Program -MET  Pt will be able to ambulate 15 minutes with pain no more than 3/10-MET  Pt will be able to ascend 1 flight of stairs reciprocally with use of handrails  -Partially MET  Pt will demonstrate good hip hinge and squatting mechanics-MET      Long Term Goals (6-8 weeks)  Pt will be independent in comprehensive Home Exercise Program -MET  Pt will be able to ambulate 45 minutes with pain no more than 2/10-NOT MET  Pt will be able to perform ADLs with pain no more than 3/10-MET  Pt will demonstrate improved SLS balance to at least 30 sec-NOT MET  Pt will demonstrate improved knee and hip MMT strength to at least 4/5- MET    Plan  Plan details: D/C with comprehensive HEP and recommendation to follow up with orthopedics secondary to persistent symptoms  Referral necessary: Yes  Planned therapy interventions: home exercise program  Treatment plan discussed with: patient        Subjective Evaluation    History of Present Illness  Mechanism of injury: Pt reports he continues to feel like he's at 90%  Pt states his pain has diminished and does not come as frequent but continues with incidences of right knee buckling at random times  Continues to negotiate stairs non reciprocally secondary to fear of knee buckling not secondary to pain  Pain can increase with prolonged walking greater than 20 min and with prolonged standing  Pt has a follow up scheduled with Dr Genaro Hernandez  Denies any pain in left knee and symptoms have fully cleared  Pain  Current pain ratin  At best pain ratin  At worst pain rating: 3  Location: right knee  Quality: dull ache and sharp  Relieving factors: medications and rest  Aggravating factors: stair climbing, standing, walking and sitting  Progression: improved    Social Support  Steps to enter house: yes  Stairs in house: yes   Lives in: multiple-level home    Employment status: working (, sitting most of the day)  Exercise history: marathon running       Diagnostic Tests  MRI studies: abnormal  Treatments  Previous treatment: injection treatment  Patient Goals  Patient goals for therapy: decreased pain, increased motion, increased strength, independence with ADLs/IADLs and return to sport/leisure activities (Partially met)  Patient goal: return to running (Not met)        Objective     Observations   Left Knee   Positive for atrophy  Negative for edema  Right Knee   Positive for atrophy  Negative for edema  Palpation   Left   No palpable tenderness to the distal semimembranosus and distal semitendinosus  Right   No palpable tenderness to the distal semimembranosus and distal semitendinosus  Tenderness   Left Knee   No tenderness in the MCL (distal) and MCL (proximal)  Right Knee   No tenderness in the MCL (distal), MCL (proximal) and superior patella  Neurological Testing     Sensation     Knee   Left Knee   Intact: light touch    Right Knee   Intact: light touch     Active Range of Motion   Left Knee   Flexion: 130 degrees   Extension: 0 degrees     Right Knee   Flexion: 130 degrees   Extension: 0 degrees     Mobility   Patellar Mobility:   Left Knee   WFL: medial and inferior  Right Knee   WFL: medial and inferior    Strength/Myotome Testing     Left Hip   Planes of Motion   Flexion: 4  Abduction: 4  Adduction: 4+    Right Hip   Planes of Motion   Flexion: 4  Abduction: 4  Adduction: 4+    Left Knee   Flexion: 4  Extension: 4+    Right Knee   Flexion: 4  Extension: 4+    Additional Strength Details  Pain with resisted right knee flexion    Tests     Left Knee   Positive valgus stress test at 30 degrees  Negative anterior drawer and patella-femoral grind  Right Knee   Positive valgus stress test at 30 degrees  Negative anterior drawer and patella-femoral grind  Ambulation     Ambulation: Stairs   Ascend stairs: independent  Pattern: non-reciprocal  Railings: without rails  Descend stairs: independent  Pattern: non-reciprocal  Railings: without rails  Curbs: independent    Additional Stairs Ambulation Details  Occasionally reciprocal    Observational Gait   Walking speed, stride length, left stance time and right step length within functional limits  Decreased right stance time and left step length  Left foot contact pattern: heel to toe  Right foot contact pattern: heel to toe  Base of support: normal    Quality of Movement During Gait     Knee    Knee (Left): Positive valgus  Knee (Right): Positive valgus  Functional Assessment      Squat    Pain, left valgus and right valgus  Forward Step Up 8"   Left Leg  Within functional limits  No pain  Right Leg  Within functional limits  No pain  Forward Step Down 8"   Left Leg  Within functional limits  No pain  Right Leg  Within functional limits  No pain  Single Leg Stance   Left: 30 seconds  Right: 10 (knee buckle) seconds    General Comments:      Knee Comments  Flexibility: mod hamstring tightness right greater than left        Flowsheet Rows      Most Recent Value   PT/OT G-Codes   Current Score  41   Projected Score  55   FOTO information reviewed  Yes   Assessment Type  Discharge   G code set  Mobility: Walking & Moving Around   Mobility: Walking and Moving Around Goal Status ()  CK   Mobility: Walking and Moving Around Discharge Status ()  CK          MedBridge access code: [email or web address]    Precautions standard    Specialty Daily Treatment Diary     Manual  2/13  1/10 1/28 2/7   PROM knee   Bilateral flex and ext Bilateral flex and ext Bilateral flex and ext   STM     Medial right knee   PFJ mobs   Inf and med gr II-III on right Inf and med gr II-III on right Inf and med gr II-III on right   Hamstring stretch   30 sec, 1x3 bilateral 30 sec, 1x3 bilateral 30 sec, 1x3 bilateral               Exercise Diary         Rec bike 7 min, lvl 1  8 min, lvl 2 6 min, lvl 1 7 min, lvl 1   gs stretch with strap        Standing hip abduction Hold 5, 2x10 ea  Hold 5, 2x10 ea Hold 5, 2x10 ea Hold 5, 2x10 ea   Step ups 6 in, 2x10  6 in, 2x10 ea 6 in, 2x10 ea 6 in, 2x10 ea   SLR Bilateral, hold 5, 2x10   Left, hold 5, 1x15 Bilateral, hold 5, 2x10   SAQ   Hold 5, 2x10 ea Hold 5, 2x10 ea    Heel slides Hold 5, 2x10        bridges Hold 5, 2x10   hold 5, 2x10 hold 5, 2x10 hold 5, 2x10   Ball Squeezes        clamshells        TKE 5 0, 1x15 ea  5 0, 1x15 ea 5 0, 1x15 ea 5 0, 2x10 ea   Wall Squats   2x10 with ball 2x10 with ball EIS     1x10                       Modalities        CP

## 2019-02-15 ENCOUNTER — APPOINTMENT (OUTPATIENT)
Dept: PHYSICAL THERAPY | Facility: CLINIC | Age: 49
End: 2019-02-15
Payer: COMMERCIAL

## 2019-02-28 VITALS
SYSTOLIC BLOOD PRESSURE: 101 MMHG | HEIGHT: 70 IN | DIASTOLIC BLOOD PRESSURE: 69 MMHG | HEART RATE: 80 BPM | WEIGHT: 131.2 LBS | BODY MASS INDEX: 18.78 KG/M2

## 2019-02-28 DIAGNOSIS — M22.42 CHONDROMALACIA OF BOTH PATELLAE: Primary | ICD-10-CM

## 2019-02-28 DIAGNOSIS — M22.41 CHONDROMALACIA OF BOTH PATELLAE: Primary | ICD-10-CM

## 2019-02-28 DIAGNOSIS — G89.29 CHRONIC PAIN OF RIGHT KNEE: ICD-10-CM

## 2019-02-28 DIAGNOSIS — M25.561 CHRONIC PAIN OF RIGHT KNEE: ICD-10-CM

## 2019-02-28 PROCEDURE — 99213 OFFICE O/P EST LOW 20 MIN: CPT | Performed by: ORTHOPAEDIC SURGERY

## 2019-02-28 NOTE — PROGRESS NOTES
Assessment/Plan:  1  Chondromalacia of both patellae  MRI knee right  wo contrast   2  Chronic pain of right knee  MRI knee right  wo contrast     Marcus Plummer is a pleasant 50year old gentleman presenting for follow up of his bilateral knee pain due to chondromalacia patella  His left knee has responded tremendously well to conservative treatment, and we have recommended that he continue his home exercise program  Unfortunately, his right knee is still troublesome and has been slower to improve  NSAIDs, a cortisone injection, and 3 months of physical therapy have failed to relieve his pain  Therefore, we have ordered a right knee MRI without contrast to evaluate for any ligamentous or meniscal pathology  For comfort, we fit him for a hinged knee brace on his right knee today that he can wear while active  We will call him with the MRI results to further delineate the plan of care  All of his questions were addressed today  Subjective: Bilateral knee follow up    Patient ID: Ed Le is a 50 y o  male  Marcus Plummer is a pleasant 50year old gentleman presenting for follow up of his bilateral knee pain  He underwent bilateral knee cortisone injections 3 months ago and was referred to physical therapy for chondromalacia patellae  He has had near complete resolution of symptoms in his left knee, with only occasional discomfort  His right knee has only seen 50% reduction of symptoms  He still has pain medially, especially first thing in the morning and with navigating stairs  He has the sensation that the right knee may give way while he is climbing stairs  He still uses naproxen as needed  He has been compliant with his home exercise program       Review of Systems   Constitutional: Negative  HENT: Negative  Eyes: Negative  Respiratory: Negative  Cardiovascular: Negative  Gastrointestinal: Negative  Endocrine: Negative  Genitourinary: Negative      Musculoskeletal: Positive for arthralgias, joint swelling and myalgias  Skin: Negative  Allergic/Immunologic: Negative  Neurological: Negative  Hematological: Negative  Psychiatric/Behavioral: Negative  History reviewed  No pertinent past medical history  History reviewed  No pertinent surgical history  Family History   Problem Relation Age of Onset    No Known Problems Mother     No Known Problems Father     Mental illness Neg Hx        Social History     Occupational History    Not on file   Tobacco Use    Smoking status: Never Smoker    Smokeless tobacco: Never Used   Substance and Sexual Activity    Alcohol use: Yes     Comment: social    Drug use: No    Sexual activity: Not on file         Current Outpatient Medications:     albuterol (PROAIR HFA) 90 mcg/act inhaler, Inhale 1-2 puffs, Disp: , Rfl:     cyclobenzaprine (FLEXERIL) 10 mg tablet, , Disp: , Rfl:     diclofenac sodium (VOLTAREN) 1 %, APPLY 4 GRAMS TOPICALLY 4 TIMES A DAY FOR 30 DAYS, Disp: 500 g, Rfl: 0    naproxen (NAPROSYN) 500 mg tablet, , Disp: , Rfl:     predniSONE 20 mg tablet, 4 tabs for three days, 3 tabs for three days, 2 tabs for three days, 1 tab for three days, 1/2 tab for 4 days, Disp: 32 tablet, Rfl: 0    No Known Allergies    Objective:  Vitals:    02/28/19 1123   BP: 101/69   Pulse: 80       Body mass index is 18 83 kg/m²  Right Knee Exam     Tenderness   The patient is experiencing tenderness in the patella, MCL, medial retinaculum and medial joint line (Medial facet of patella)      Range of Motion   Extension: 0   Flexion: 130     Tests   Patrick:  Medial - positive (equivocal) Lateral - negative  Varus: negative Valgus: negative  Lachman:  Anterior - negative      Drawer:  Anterior - negative      Patellar apprehension: negative    Other   Erythema: absent  Scars: absent  Sensation: normal  Pulse: present  Swelling: none  Effusion: no effusion present    Comments:  Mild parapatellar crepitance  Equivocal Patrick test  Collateral ligaments stable at 0, 30, and 90 degrees, but there is discomfort at the [facility] with valgus stressing  Thigh and calf soft and non-tender  Grossly distally neurovascularly unchanged          Observations     Right Knee   Negative for effusion  Physical Exam   Constitutional: He is oriented to person, place, and time  He appears well-developed and well-nourished  Body mass index is 18 83 kg/m²  HENT:   Head: Normocephalic and atraumatic  Eyes: EOM are normal    Neck: Normal range of motion  Cardiovascular: Intact distal pulses  Pulmonary/Chest: Effort normal    Musculoskeletal:        Right knee: He exhibits no effusion  See ortho exam   Neurological: He is alert and oriented to person, place, and time  Skin: Skin is warm and dry  Psychiatric: He has a normal mood and affect   His behavior is normal  Judgment and thought content normal

## 2021-03-18 ENCOUNTER — TELEPHONE (OUTPATIENT)
Dept: FAMILY MEDICINE CLINIC | Facility: CLINIC | Age: 51
End: 2021-03-18

## 2021-03-18 NOTE — TELEPHONE ENCOUNTER
Patient has not been seen since 2018  Please verify PCP  He is due for an appt   LMOM for a call back  ,   Ananth Wood MA

## 2021-03-18 NOTE — LETTER
March 26, 2021     Brillion Pace   [address] [facility] [id number]      Dear Mr Solo Ochoa:    In addition to helping you feel better when you are sick, we are interested in preventing illness and injury in the first place  In the spirit of maintaining your good health, our system indicates that you are due for the following:    Annual Physical     Please call us to make an appointment at your earliest convenience  I look forward to seeing you soon          Sincerely,         Dyllan Sanders MA    CC: No Recipients